# Patient Record
(demographics unavailable — no encounter records)

---

## 2025-01-27 NOTE — ASSESSMENT
[FreeTextEntry1] : 36 yo M with no significant past medical history presents today for second opinion of chest pain.

## 2025-01-27 NOTE — HISTORY OF PRESENT ILLNESS
[FreeTextEntry1] : 36 yo M with no significant past medical history presents today for second opinion of chest pain.  He previously had a cardiology workup with Dr. Valenzuela.  Echocardiogram and Holter monitor were unremarkable.  He was offered coronary CTA however had not yet done this.  Today, he presents with intermittent chest pain both at rest and with exertion.  It is not on reproducible with palpation.  He also reports small joint pain in his hands as well as knee pain.  He notes he cannot walk more than 5 steps without having right knee pain.  He denies dyspnea at rest or exertion, orthopnea, changes in appetite, changes in weight, bendopnea, lightheadedness, dizziness, and syncope/pre-syncope.  He is originally from the [facility].  [place] has been to the [place].  Never smoker.  Rare EtOH use.  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  Family history: Mother has had 2 valve surgeries.  No history of sudden cardiac death or early death.

## 2025-01-27 NOTE — DISCUSSION/SUMMARY
[FreeTextEntry1] : # Atypical chest pain  -Obtain exercise stress echo.  Based on results, can consider coronary CTA to rule out any anomalous coronary arteries or myocardial bridging. -Prior echocardiogram reviewed and discussed with patient.  No prior pericardial effusion.  Although there is noted to be mild TR and MR, this is thought to be insignificant.  # Right kneed pain -Pain to palpation behind knee.  Will obtain lower extremity DVT ultrasound. -Also has small joint pain.  May benefit from rheumatology evaluation.  Will do general rheumatology screening-ESR, GEOVANNA, double-stranded DNA.  May need referral to rheumatologist.  # Cardiovascular risk stratification -Will obtain lipid panel, LP(a) and hemoglobin A1c.  Will calculate ASCVD risk based on this.  F/U after above testing. [EKG obtained to assist in diagnosis and management of assessed problem(s)] : EKG obtained to assist in diagnosis and management of assessed problem(s)

## 2025-01-27 NOTE — CARDIOLOGY SUMMARY
[de-identified] : 1/27/2025: NSR, no ischemic changes. [de-identified] : Holter monitor 9/13 - 9/20/2024: NSR, HR 51-1 41, average 78 bpm, PVC burden < 0.02%,  [de-identified] : 5/23/2024: LVEF 58%, LVEDD 4.4 cm, normal RV, mild TR, PASP 19 mmHg, IVSD 0.6 cm, normal IVC.

## 2025-01-27 NOTE — PHYSICAL EXAM
[Normal Venous Pressure] : normal venous pressure [No Carotid Bruit] : no carotid bruit [Normal] : soft, non-tender, no masses/organomegaly, normal bowel sounds [No Edema] : no edema [No Rash] : no rash [Moves all extremities] : moves all extremities [Alert and Oriented] : alert and oriented [de-identified] : pain in palpation posterior right knee, no swelling or redness [de-identified] : warm

## 2025-02-26 NOTE — DISCUSSION/SUMMARY
[FreeTextEntry1] : # Atypical chest pain  -Chest pain is completely resolved. - Stress echo done today.  Patient had excellent exercise tolerance for his age and did not have any ischemic EKG or echo findings.  PASP at peak exercise 28.  -Should he have recurrence of symptoms, can consider coronary CTA to rule out any anomalous coronary arteries or myocardial bridging. -Prior echocardiogram reviewed and discussed with patient.  No prior pericardial effusion.  Although there is noted to be mild TR and MR, this is thought to be insignificant. -2/23/2025: K4.2, creatinine 1.08, normal liver enzymes, Mg 2.3.  # Right knee pain -Pain to palpation behind knee.  Will obtain lower extremity DVT ultrasound - ordered last visit but not yet done. -Also has small joint pain.  May benefit from rheumatology evaluation.  W - Had last on 2/23/25: general rheumatology screening-ESR unremarkable. - Pending GEOVANNA, and double-stranded DNA.  May need referral to rheumatologist. -2/23/2025: Normal SPEP (K:L 1.0) with no monoclonal band seen on JO, RF <10, Uric Acid 4.2.  # Cardiovascular risk stratification - ASCVD risk 0.5-2.0%. -2/23/25: -Lipoprotein a 46.2 (WNL), cCRP 0.2 (WNL) -Lipid panel , TG 42, HDL 55, .  F/U in 1 year or earlier is symptoms return.

## 2025-02-26 NOTE — PHYSICAL EXAM
[Normal Venous Pressure] : normal venous pressure [No Carotid Bruit] : no carotid bruit [Normal] : soft, non-tender, no masses/organomegaly, normal bowel sounds [No Edema] : no edema [No Rash] : no rash [Moves all extremities] : moves all extremities [Alert and Oriented] : alert and oriented [de-identified] : pain in palpation posterior right knee, no swelling or redness [de-identified] : warm

## 2025-02-26 NOTE — ASSESSMENT
[FreeTextEntry1] : 38 yo M with no significant past medical history presents today for follow up of cardiac testing.

## 2025-02-26 NOTE — CARDIOLOGY SUMMARY
[de-identified] : 1/27/2025: NSR, no ischemic changes. [de-identified] : Holter monitor 9/13 - 9/20/2024: NSR, HR 51-1 41, average 78 bpm, PVC burden < 0.02% [de-identified] : Stress echo 2/26/2025: 10.3 METS, 9 min, MPHR 1 6590%, no chest pain, no significant ST-T wave changes, no ischemic wall motion abnormality seen.  PASP 28 mmHg @ peak stress. [de-identified] : 5/23/2024: LVEF 58%, LVEDD 4.4 cm, normal RV, mild TR, PASP 19 mmHg, IVSD 0.6 cm, normal IVC.

## 2025-02-26 NOTE — HISTORY OF PRESENT ILLNESS
[FreeTextEntry1] : 38 yo M with no significant past medical history presents today for follow up of cardiac testing.  He previously had a cardiology workup with Dr. Valenzuela.  Echocardiogram and Holter monitor were unremarkable.  He was offered coronary CTA however had not yet done this.  I first met him on 1/27/25.  We had ordered a stress echo which he had done today.  He notes that his chest pain is completely resolved since last visit.  He does still have small joint pains in his right hand and his right knee.  However, he was able to walk 9 minutes on the treadmill today and achieved 10.3 METS without any symptoms of knee pain. He denies dyspnea at rest or exertion, orthopnea, changes in appetite, changes in weight, bendopnea, lightheadedness, dizziness, and syncope/pre-syncope.  He is originally from the [facility].  He has been to the [place] for [place].  Never smoker.  Rare EtOH use.  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  Family history: Mother has had 2 valve surgeries.  No history of sudden cardiac death or early death.